# Patient Record
Sex: MALE | Race: WHITE | NOT HISPANIC OR LATINO | Employment: OTHER | URBAN - METROPOLITAN AREA
[De-identification: names, ages, dates, MRNs, and addresses within clinical notes are randomized per-mention and may not be internally consistent; named-entity substitution may affect disease eponyms.]

---

## 2017-03-08 ENCOUNTER — HOSPITAL ENCOUNTER (OUTPATIENT)
Facility: MEDICAL CENTER | Age: 57
End: 2017-03-08
Attending: EMERGENCY MEDICINE
Payer: COMMERCIAL

## 2017-03-08 ENCOUNTER — OFFICE VISIT (OUTPATIENT)
Dept: URGENT CARE | Facility: PHYSICIAN GROUP | Age: 57
End: 2017-03-08
Payer: COMMERCIAL

## 2017-03-08 VITALS
BODY MASS INDEX: 27.09 KG/M2 | SYSTOLIC BLOOD PRESSURE: 108 MMHG | OXYGEN SATURATION: 96 % | HEIGHT: 72 IN | TEMPERATURE: 98.6 F | RESPIRATION RATE: 16 BRPM | DIASTOLIC BLOOD PRESSURE: 78 MMHG | HEART RATE: 80 BPM | WEIGHT: 200 LBS

## 2017-03-08 DIAGNOSIS — R30.0 DYSURIA: ICD-10-CM

## 2017-03-08 LAB
APPEARANCE UR: CLEAR
BILIRUB UR STRIP-MCNC: NEGATIVE MG/DL
COLOR UR AUTO: NORMAL
GLUCOSE UR STRIP.AUTO-MCNC: NEGATIVE MG/DL
KETONES UR STRIP.AUTO-MCNC: NEGATIVE MG/DL
LEUKOCYTE ESTERASE UR QL STRIP.AUTO: NEGATIVE
NITRITE UR QL STRIP.AUTO: NEGATIVE
PH UR STRIP.AUTO: 6 [PH] (ref 5–8)
PROT UR QL STRIP: NEGATIVE MG/DL
RBC UR QL AUTO: NEGATIVE
SP GR UR STRIP.AUTO: 1.02
UROBILINOGEN UR STRIP-MCNC: NEGATIVE MG/DL

## 2017-03-08 PROCEDURE — 87591 N.GONORRHOEAE DNA AMP PROB: CPT

## 2017-03-08 PROCEDURE — 87491 CHLMYD TRACH DNA AMP PROBE: CPT

## 2017-03-08 PROCEDURE — 87086 URINE CULTURE/COLONY COUNT: CPT

## 2017-03-08 PROCEDURE — 99214 OFFICE O/P EST MOD 30 MIN: CPT | Performed by: EMERGENCY MEDICINE

## 2017-03-08 PROCEDURE — 81002 URINALYSIS NONAUTO W/O SCOPE: CPT | Performed by: EMERGENCY MEDICINE

## 2017-03-08 ASSESSMENT — ENCOUNTER SYMPTOMS
EYE DISCHARGE: 0
HEADACHES: 0
CHILLS: 0
TINGLING: 0
COUGH: 0
NERVOUS/ANXIOUS: 0
EYE REDNESS: 0
HEMOPTYSIS: 0
SWEATS: 0
VOMITING: 0

## 2017-03-08 NOTE — MR AVS SNAPSHOT
"Jeremy Berkowitz   3/8/2017 9:55 AM   Office Visit   MRN: [id number]    Department:  [facility]   Dept Phone:  [phone]    Description:  Male : 1960   Provider:  OBIE Mayes M.D.           Reason for Visit     Urinary Pain C/o slight pain/burning, retention, bad odor, red spots on glans penis (uses soap and water to clean tip) x6 months      Allergies as of 3/8/2017     No Known Allergies      You were diagnosed with     Dysuria   [788.1.ICD-9-CM]         Vital Signs     Blood Pressure Pulse Temperature Respirations Height Weight    108/78 mmHg 80 37 °C (98.6 °F) 16 1.829 m (6' 0.01\") 90.719 kg (200 lb)    Body Mass Index Oxygen Saturation Smoking Status             27.12 kg/m2 96% Never Smoker          Basic Information     Date Of Birth Sex Race Ethnicity Preferred Language    1960 Male White Non- English      Health Maintenance        Date Due Completion Dates    COLONOSCOPY 3/14/2010 ---    IMM INFLUENZA (1) 2016 ---    IMM DTaP/Tdap/Td Vaccine (2 - Td) 2024            Results     POCT Urinalysis      Component Value Standard Range & Units    POC Color Dark Yellow Negative    POC Appearance Clear Negative    POC Leukocyte Esterase Negative Negative    POC Nitrites Negative Negative    POC Urobiligen Negative Negative (0.2) mg/dL    POC Protein Negative Negative mg/dL    POC Urine PH 6.0 5.0 - 8.0    POC Blood Negative Negative    POC Specific Gravity 1.020 <1.005 - >1.030    POC Ketones Negative Negative mg/dL    POC Biliruben Negative Negative mg/dL    POC Glucose Negative Negative mg/dL                        Current Immunizations     Tdap Vaccine 2014  3:07 PM      Below and/or attached are the medications your provider expects you to take. Review all of your home medications and newly ordered medications with your provider and/or pharmacist. Follow medication instructions as directed by your provider and/or pharmacist. Please keep your " medication list with you and share with your provider. Update the information when medications are discontinued, doses are changed, or new medications (including over-the-counter products) are added; and carry medication information at all times in the event of emergency situations     Allergies:  No Known Allergies          Medications  Valid as of: March 08, 2017 - 11:42 AM    Generic Name Brand Name Tablet Size Instructions for use    Azithromycin (Tab) ZITHROMAX 250 MG Z-kristel, Use as directed.        .                 Medicines prescribed today were sent to:     [id number] - [place] - [address] [place] [address]    Phone: [phone] Fax: [phone]    Open 24 Hours?: No      Medication refill instructions:       If your prescription bottle indicates you have medication refills left, it is not necessary to call your provider’s office. Please contact your pharmacy and they will refill your medication.    If your prescription bottle indicates you do not have any refills left, you may request refills at any time through one of the following ways: The online [employer] system (except Urgent Care), by calling your provider’s office, or by asking your pharmacy to contact your provider’s office with a refill request. Medication refills are processed only during regular business hours and may not be available until the next business day. Your provider may request additional information or to have a follow-up visit with you prior to refilling your medication.   *Please Note: Medication refills are assigned a new Rx number when refilled electronically. Your pharmacy may indicate that no refills were authorized even though a new prescription for the same medication is available at the pharmacy. Please request the medicine by name with the pharmacy before contacting your provider for a refill.        Your To Do List     Future Labs/Procedures Complete By Expires    CHLAMYDIA/GC PCR URINE OR SWAB  As  directed 3/8/2018    URINE CULTURE(NEW)  As directed 3/8/2018      Instructions    We will call the patient with lab results.          [employer] Access Code: Activation code not generated  Current [employer] Status: Active

## 2017-03-08 NOTE — PROGRESS NOTES
Subjective:      Jeremy Berkowitz is a 56 y.o. male who presents with Urinary Pain            Dysuria   This is a new problem. The current episode started more than 1 month ago (6 months). The problem has been waxing and waning (patient states that he's had dysuria with penile tip lesions for the past 6 months associated with foul-smelling urine. His wife was recently treated for UTI he's going to check with her call me with the results of her infection. He also has porphyria.). The pain is mild. There has been no fever. He is sexually active. There is no history of pyelonephritis. Associated symptoms include a discharge (occassionally). Pertinent negatives include no chills, hematuria, sweats or vomiting. Associated symptoms comments: malodorous urine. Patient states he has to clean his penis for times a day or shower frequently more than 2 times a day in order to keep the red irritation from. Treatments tried: frequent showers. There is no history of catheterization, kidney stones, recurrent UTIs, a single kidney, urinary stasis or a urological procedure.     No Known Allergies    Social History     Social History   • Marital Status:      Spouse Name: N/A   • Number of Children: N/A   • Years of Education: N/A     Occupational History   • Not on file.     Social History Main Topics   • Smoking status: Never Smoker    • Smokeless tobacco: Not on file   • Alcohol Use: Yes      Comment: occ   • Drug Use: No   • Sexual Activity: Not on file     Other Topics Concern   • Not on file     Social History Narrative   History reviewed. No pertinent past medical history.   Current Outpatient Prescriptions on File Prior to Visit   Medication Sig Dispense Refill   • azithromycin (ZITHROMAX) 250 MG Tab Z-kristel, Use as directed. 6 Tab 0     No current facility-administered medications on file prior to visit.   History reviewed. No pertinent family history.   Review of Systems   Constitutional: Negative for chills.  "  Eyes: Negative for discharge and redness.   Respiratory: Negative for cough and hemoptysis.    Cardiovascular: Negative for chest pain.   Gastrointestinal: Negative for vomiting.   Genitourinary: Positive for dysuria. Negative for hematuria.   Skin: Positive for rash.        Intermittent rash on the tip of his penis. Resolved with frequent cleaning. No history of diabetes.   Neurological: Negative for tingling and headaches.   Psychiatric/Behavioral: The patient is not nervous/anxious.           Objective:     /78 mmHg  Pulse 80  Temp(Src) 37 °C (98.6 °F)  Resp 16  Ht 1.829 m (6' 0.01\")  Wt 90.719 kg (200 lb)  BMI 27.12 kg/m2  SpO2 96%     Physical Exam   Constitutional: He appears well-developed and well-nourished. No distress.   HENT:   Head: Normocephalic and atraumatic.   Right Ear: External ear normal.   Left Ear: External ear normal.   Eyes: Conjunctivae are normal. Right eye exhibits no discharge. Left eye exhibits no discharge.   Neck: Normal range of motion.   Genitourinary: Prostate normal and penis normal. Guaiac negative stool. No penile tenderness.   Patient is uncircumcised and has no penile lesions bilaterally descended. His prostate is nontender, guaiac negative stool.   Musculoskeletal: Normal range of motion.   Neurological: He is alert.   Skin: Skin is warm and dry. No rash noted. He is not diaphoretic. No erythema.   Psychiatric: He has a normal mood and affect.   Vitals reviewed.           POCT UA Neg     Urine culture pending    GC / Chalmydia pending  Assessment/Plan:     1. Dysuria    Patient will push fluids.  "

## 2017-03-09 LAB
C TRACH DNA GENITAL QL NAA+PROBE: NEGATIVE
N GONORRHOEA DNA GENITAL QL NAA+PROBE: NEGATIVE
SPECIMEN SOURCE: NORMAL

## 2017-03-10 LAB
BACTERIA UR CULT: NORMAL
SIGNIFICANT IND 70042: NORMAL
SITE SITE: NORMAL
SOURCE SOURCE: NORMAL

## 2017-03-11 ENCOUNTER — HOSPITAL ENCOUNTER (OUTPATIENT)
Dept: LAB | Facility: MEDICAL CENTER | Age: 57
End: 2017-03-11
Attending: PHYSICIAN ASSISTANT
Payer: COMMERCIAL

## 2017-03-11 ENCOUNTER — TELEPHONE (OUTPATIENT)
Dept: URGENT CARE | Facility: PHYSICIAN GROUP | Age: 57
End: 2017-03-11

## 2017-03-11 LAB
ALBUMIN SERPL BCP-MCNC: 4 G/DL (ref 3.2–4.9)
ALBUMIN/GLOB SERPL: 1.3 G/DL
ALP SERPL-CCNC: 61 U/L (ref 30–99)
ALT SERPL-CCNC: 18 U/L (ref 2–50)
ANION GAP SERPL CALC-SCNC: 11 MMOL/L (ref 0–11.9)
AST SERPL-CCNC: 23 U/L (ref 12–45)
BASOPHILS # BLD AUTO: 0.03 K/UL (ref 0–0.12)
BASOPHILS NFR BLD AUTO: 0.6 % (ref 0–1.8)
BILIRUB SERPL-MCNC: 0.7 MG/DL (ref 0.1–1.5)
BUN SERPL-MCNC: 19 MG/DL (ref 8–22)
CALCIUM SERPL-MCNC: 9.2 MG/DL (ref 8.5–10.5)
CHLORIDE SERPL-SCNC: 107 MMOL/L (ref 96–112)
CHOLEST SERPL-MCNC: 190 MG/DL (ref 100–199)
CO2 SERPL-SCNC: 20 MMOL/L (ref 20–33)
CREAT SERPL-MCNC: 1.03 MG/DL (ref 0.5–1.4)
EOSINOPHIL # BLD: 0.07 K/UL (ref 0–0.51)
EOSINOPHIL NFR BLD AUTO: 1.3 % (ref 0–6.9)
ERYTHROCYTE [DISTWIDTH] IN BLOOD BY AUTOMATED COUNT: 42 FL (ref 35.9–50)
FERRITIN SERPL-MCNC: 10.1 NG/ML (ref 22–322)
GLOBULIN SER CALC-MCNC: 3 G/DL (ref 1.9–3.5)
GLUCOSE SERPL-MCNC: 85 MG/DL (ref 65–99)
HCT VFR BLD AUTO: 42.3 % (ref 42–52)
HDLC SERPL-MCNC: 60 MG/DL
HGB BLD-MCNC: 13.3 G/DL (ref 14–18)
IMM GRANULOCYTES # BLD AUTO: 0.01 K/UL (ref 0–0.11)
IMM GRANULOCYTES NFR BLD AUTO: 0.2 % (ref 0–0.9)
LDLC SERPL CALC-MCNC: 120 MG/DL
LYMPHOCYTES # BLD: 2.05 K/UL (ref 1–4.8)
LYMPHOCYTES NFR BLD AUTO: 38 % (ref 22–41)
MCH RBC QN AUTO: 25.8 PG (ref 27–33)
MCHC RBC AUTO-ENTMCNC: 31.4 G/DL (ref 33.7–35.3)
MCV RBC AUTO: 82 FL (ref 81.4–97.8)
MONOCYTES # BLD: 0.44 K/UL (ref 0–0.85)
MONOCYTES NFR BLD AUTO: 8.1 % (ref 0–13.4)
NEUTROPHILS # BLD: 2.8 K/UL (ref 1.82–7.42)
NEUTROPHILS NFR BLD AUTO: 51.8 % (ref 44–72)
NRBC # BLD AUTO: 0 K/UL
NRBC BLD-RTO: 0 /100 WBC
PLATELET # BLD AUTO: 239 K/UL (ref 164–446)
PMV BLD AUTO: 10.7 FL (ref 9–12.9)
POTASSIUM SERPL-SCNC: 4.7 MMOL/L (ref 3.6–5.5)
PROT SERPL-MCNC: 7 G/DL (ref 6–8.2)
PSA SERPL DL<=0.01 NG/ML-MCNC: 0.24 NG/ML (ref 0–4)
RBC # BLD AUTO: 5.16 M/UL (ref 4.7–6.1)
SODIUM SERPL-SCNC: 138 MMOL/L (ref 135–145)
TRIGL SERPL-MCNC: 49 MG/DL (ref 0–149)
WBC # BLD AUTO: 5.4 K/UL (ref 4.8–10.8)

## 2017-03-11 PROCEDURE — 82728 ASSAY OF FERRITIN: CPT

## 2017-03-11 PROCEDURE — 84120 ASSAY OF URINE PORPHYRINS: CPT

## 2017-03-11 PROCEDURE — 85025 COMPLETE CBC W/AUTO DIFF WBC: CPT

## 2017-03-11 PROCEDURE — 36415 COLL VENOUS BLD VENIPUNCTURE: CPT

## 2017-03-11 PROCEDURE — 84153 ASSAY OF PSA TOTAL: CPT

## 2017-03-11 PROCEDURE — 80061 LIPID PANEL: CPT

## 2017-03-11 PROCEDURE — 84110 ASSAY OF PORPHOBILINOGEN: CPT

## 2017-03-11 PROCEDURE — 80053 COMPREHEN METABOLIC PANEL: CPT

## 2017-03-15 LAB
COLLECTION LENGTH 278029: ABNORMAL
COPRO1/CREAT UR-SRTO: 1 UMOL/MOL CRT (ref 0–6)
COPRO3/CREAT UR-SRTO: 3 UMOL/MOL CRT (ref 0–14)
CREAT 24H UR-MCNC: 134 MG/DL
CREAT 24H UR-MRATE: ABNORMAL MG/D (ref 800–2100)
HEPTACARBOXYLATE/CREAT UR-SRTO: 2 UMOL/MOL CRT (ref 0–2)
PBG 24H UR-SRATE: ABNORMAL UMOL/D (ref 0–11)
PBG UR-SCNC: 3.4 UMOL/L (ref 0–8.8)
PORPHYRIN FRACT 24H UR-IMP: ABNORMAL
TOTAL VOLUME 1105: 1775 ML
UROPOR/CREAT 24H UR-SRTO: 10 UMOL/MOL CRT (ref 0–4)

## 2018-03-31 ENCOUNTER — HOSPITAL ENCOUNTER (OUTPATIENT)
Dept: LAB | Facility: MEDICAL CENTER | Age: 58
End: 2018-03-31
Attending: FAMILY MEDICINE
Payer: COMMERCIAL

## 2018-03-31 LAB
FERRITIN SERPL-MCNC: 50.5 NG/ML (ref 22–322)
IRON SATN MFR SERPL: 28 % (ref 15–55)
IRON SERPL-MCNC: 119 UG/DL (ref 50–180)
TIBC SERPL-MCNC: 427 UG/DL (ref 250–450)

## 2018-03-31 PROCEDURE — 83540 ASSAY OF IRON: CPT

## 2018-03-31 PROCEDURE — 84110 ASSAY OF PORPHOBILINOGEN: CPT

## 2018-03-31 PROCEDURE — 82728 ASSAY OF FERRITIN: CPT

## 2018-03-31 PROCEDURE — 83550 IRON BINDING TEST: CPT

## 2018-03-31 PROCEDURE — 36415 COLL VENOUS BLD VENIPUNCTURE: CPT

## 2018-04-03 LAB
COLLECT DURATION TIME SPEC: 24 HRS
CREAT 24H UR-MCNC: 171 MG/DL
CREAT 24H UR-MRATE: 2266 MG/D (ref 800–2100)
PBG 24H UR-SRATE: 6.2 UMOL/D (ref 0–11)
PBG UR-SCNC: 4.7 UMOL/L (ref 0–8.8)
TOTAL VOLUME 1105: 1325 ML

## 2019-02-23 ENCOUNTER — HOSPITAL ENCOUNTER (OUTPATIENT)
Facility: MEDICAL CENTER | Age: 59
End: 2019-02-23
Attending: FAMILY MEDICINE
Payer: COMMERCIAL

## 2019-02-23 PROCEDURE — 82570 ASSAY OF URINE CREATININE: CPT

## 2019-02-23 PROCEDURE — 84120 ASSAY OF URINE PORPHYRINS: CPT

## 2019-02-23 PROCEDURE — 84110 ASSAY OF PORPHOBILINOGEN: CPT

## 2019-02-27 LAB
COPRO1/CREAT UR-SRTO: 1 UMOL/MOL CRT (ref 0–6)
COPRO3/CREAT UR-SRTO: 2 UMOL/MOL CRT (ref 0–14)
CREAT 24H UR-MCNC: 107 MG/DL
CREAT 24H UR-MRATE: 2033 MG/D (ref 800–2100)
HEPTACARBOXYLATE/CREAT UR-SRTO: 1 UMOL/MOL CRT (ref 0–2)
PBG 24H UR-SRATE: 7.4 UMOL/D (ref 0–11)
PBG UR-SCNC: 3.9 UMOL/L (ref 0–8.8)
PORPHYRIN FRACT 24H UR-IMP: NEGATIVE
TOTAL VOLUME 1105: 1900 ML
UROPOR/CREAT 24H UR-SRTO: 3 UMOL/MOL CRT (ref 0–4)

## 2020-07-14 ENCOUNTER — HOSPITAL ENCOUNTER (OUTPATIENT)
Dept: LAB | Facility: MEDICAL CENTER | Age: 60
End: 2020-07-14
Attending: FAMILY MEDICINE
Payer: COMMERCIAL

## 2020-07-14 LAB
ALBUMIN SERPL BCP-MCNC: 4.3 G/DL (ref 3.2–4.9)
ALBUMIN/GLOB SERPL: 1.7 G/DL
ALP SERPL-CCNC: 67 U/L (ref 30–99)
ALT SERPL-CCNC: 18 U/L (ref 2–50)
ANION GAP SERPL CALC-SCNC: 12 MMOL/L (ref 7–16)
AST SERPL-CCNC: 19 U/L (ref 12–45)
BASOPHILS # BLD AUTO: 0.6 % (ref 0–1.8)
BASOPHILS # BLD: 0.03 K/UL (ref 0–0.12)
BILIRUB SERPL-MCNC: 0.6 MG/DL (ref 0.1–1.5)
BUN SERPL-MCNC: 18 MG/DL (ref 8–22)
CALCIUM SERPL-MCNC: 9.5 MG/DL (ref 8.5–10.5)
CHLORIDE SERPL-SCNC: 102 MMOL/L (ref 96–112)
CHOLEST SERPL-MCNC: 207 MG/DL (ref 100–199)
CO2 SERPL-SCNC: 25 MMOL/L (ref 20–33)
CREAT SERPL-MCNC: 0.84 MG/DL (ref 0.5–1.4)
EOSINOPHIL # BLD AUTO: 0.12 K/UL (ref 0–0.51)
EOSINOPHIL NFR BLD: 2.4 % (ref 0–6.9)
ERYTHROCYTE [DISTWIDTH] IN BLOOD BY AUTOMATED COUNT: 43 FL (ref 35.9–50)
FASTING STATUS PATIENT QL REPORTED: NORMAL
GLOBULIN SER CALC-MCNC: 2.5 G/DL (ref 1.9–3.5)
GLUCOSE SERPL-MCNC: 99 MG/DL (ref 65–99)
HCT VFR BLD AUTO: 47.5 % (ref 42–52)
HDLC SERPL-MCNC: 74 MG/DL
HGB BLD-MCNC: 15.4 G/DL (ref 14–18)
IMM GRANULOCYTES # BLD AUTO: 0.01 K/UL (ref 0–0.11)
IMM GRANULOCYTES NFR BLD AUTO: 0.2 % (ref 0–0.9)
LDLC SERPL CALC-MCNC: 115 MG/DL
LYMPHOCYTES # BLD AUTO: 1.9 K/UL (ref 1–4.8)
LYMPHOCYTES NFR BLD: 38.2 % (ref 22–41)
MCH RBC QN AUTO: 29.8 PG (ref 27–33)
MCHC RBC AUTO-ENTMCNC: 32.4 G/DL (ref 33.7–35.3)
MCV RBC AUTO: 91.9 FL (ref 81.4–97.8)
MONOCYTES # BLD AUTO: 0.32 K/UL (ref 0–0.85)
MONOCYTES NFR BLD AUTO: 6.4 % (ref 0–13.4)
NEUTROPHILS # BLD AUTO: 2.59 K/UL (ref 1.82–7.42)
NEUTROPHILS NFR BLD: 52.2 % (ref 44–72)
NRBC # BLD AUTO: 0 K/UL
NRBC BLD-RTO: 0 /100 WBC
PLATELET # BLD AUTO: 207 K/UL (ref 164–446)
PMV BLD AUTO: 9.7 FL (ref 9–12.9)
POTASSIUM SERPL-SCNC: 4.9 MMOL/L (ref 3.6–5.5)
PROT SERPL-MCNC: 6.8 G/DL (ref 6–8.2)
PSA SERPL-MCNC: 0.25 NG/ML (ref 0–4)
RBC # BLD AUTO: 5.17 M/UL (ref 4.7–6.1)
SODIUM SERPL-SCNC: 139 MMOL/L (ref 135–145)
TRIGL SERPL-MCNC: 91 MG/DL (ref 0–149)
WBC # BLD AUTO: 5 K/UL (ref 4.8–10.8)

## 2020-07-14 PROCEDURE — 85025 COMPLETE CBC W/AUTO DIFF WBC: CPT

## 2020-07-14 PROCEDURE — 84153 ASSAY OF PSA TOTAL: CPT

## 2020-07-14 PROCEDURE — 36415 COLL VENOUS BLD VENIPUNCTURE: CPT

## 2020-07-14 PROCEDURE — 80053 COMPREHEN METABOLIC PANEL: CPT

## 2020-07-14 PROCEDURE — 80061 LIPID PANEL: CPT

## 2020-08-18 ENCOUNTER — HOSPITAL ENCOUNTER (OUTPATIENT)
Dept: RADIOLOGY | Facility: MEDICAL CENTER | Age: 60
End: 2020-08-18
Attending: FAMILY MEDICINE
Payer: COMMERCIAL

## 2020-08-18 DIAGNOSIS — R07.89 CHEST DISCOMFORT: ICD-10-CM

## 2020-08-18 DIAGNOSIS — I70.90 ARTERIOSCLEROSIS: ICD-10-CM

## 2020-08-18 PROCEDURE — 4410556 CT-CARDIAC SCORING

## 2020-08-25 ENCOUNTER — HOSPITAL ENCOUNTER (OUTPATIENT)
Dept: CARDIOLOGY | Facility: MEDICAL CENTER | Age: 60
End: 2020-08-25
Attending: FAMILY MEDICINE
Payer: COMMERCIAL

## 2020-08-25 DIAGNOSIS — R07.89 CHEST DISCOMFORT: ICD-10-CM

## 2020-08-25 DIAGNOSIS — I70.90 ARTERIOSCLEROSIS: ICD-10-CM

## 2020-08-25 LAB — LV EJECT FRACT  99904: 65

## 2020-08-25 PROCEDURE — 93017 CV STRESS TEST TRACING ONLY: CPT

## 2020-08-25 PROCEDURE — 93350 STRESS TTE ONLY: CPT | Mod: 26 | Performed by: INTERNAL MEDICINE

## 2020-08-25 PROCEDURE — 93018 CV STRESS TEST I&R ONLY: CPT | Performed by: INTERNAL MEDICINE

## 2020-09-24 ENCOUNTER — OFFICE VISIT (OUTPATIENT)
Dept: CARDIOLOGY | Facility: MEDICAL CENTER | Age: 60
End: 2020-09-24
Payer: COMMERCIAL

## 2020-09-24 VITALS
OXYGEN SATURATION: 97 % | WEIGHT: 189 LBS | DIASTOLIC BLOOD PRESSURE: 82 MMHG | SYSTOLIC BLOOD PRESSURE: 120 MMHG | HEIGHT: 69 IN | BODY MASS INDEX: 27.99 KG/M2 | HEART RATE: 58 BPM | RESPIRATION RATE: 12 BRPM

## 2020-09-24 DIAGNOSIS — R07.89 CHEST DISCOMFORT: ICD-10-CM

## 2020-09-24 DIAGNOSIS — I44.7 LBBB (LEFT BUNDLE BRANCH BLOCK): ICD-10-CM

## 2020-09-24 DIAGNOSIS — E78.2 MIXED HYPERLIPIDEMIA: ICD-10-CM

## 2020-09-24 PROCEDURE — 99204 OFFICE O/P NEW MOD 45 MIN: CPT | Performed by: INTERNAL MEDICINE

## 2020-09-24 ASSESSMENT — ENCOUNTER SYMPTOMS
PARESTHESIAS: 0
DIZZINESS: 0
FEVER: 0
LOSS OF BALANCE: 0
SHORTNESS OF BREATH: 0
LIGHT-HEADEDNESS: 0
CONSTIPATION: 0
VOMITING: 0
FALLS: 0
DOUBLE VISION: 0
SORE THROAT: 0
BLURRED VISION: 0
NEAR-SYNCOPE: 0
NIGHT SWEATS: 0
COUGH: 0
WHEEZING: 0
NAUSEA: 0
SYNCOPE: 0
DIAPHORESIS: 0
PND: 0
PALPITATIONS: 0
DECREASED APPETITE: 0
WEAKNESS: 0
ORTHOPNEA: 0
BACK PAIN: 0
BLOATING: 0
NUMBNESS: 0
MYALGIAS: 0
EXCESSIVE DAYTIME SLEEPINESS: 0
DYSPNEA ON EXERTION: 0
DIARRHEA: 0
IRREGULAR HEARTBEAT: 0
SLEEP DISTURBANCES DUE TO BREATHING: 0
HEADACHES: 0

## 2020-09-24 ASSESSMENT — FIBROSIS 4 INDEX: FIB4 SCORE: 1.3

## 2020-09-24 NOTE — PROGRESS NOTES
Cardiology Initial Consultation Note    Date of note:    9/24/2020    Primary Care Provider: Hi Rader M.D.  Referring Provider: Hi Rader M.D.     Patient Name: Jeremy Berkowitz   YOB: 1960  MRN:              [id number]    Chief Complaint   Patient presents with   • Hyperlipidemia     NP       History of Present Illness: Mr. Jeremy Berkowitz is a 60 y.o. male whose current medical problems include hyperlipidemia who is here for cardiac consultation for left bundle branch block.    Patient states that he had back x-ray done and was noted to have atherosclerotic plaque in the abdominal aorta.  Based on that his primary care physician, Dr. Rader, ordered ECG, CT coronary calcium and stress echocardiogram.  He was noted to have left bundle branch block on the ECG, CT coronary calcium score was 0 and during the treadmill stress echocardiogram he achieved 9.8 METS with nondiagnostic ECG and abnormal dyskinetic septal wall motion abnormality due to left bundle branch block with no visible inducible ischemia and decrease in left ventricular size with stress.    Patient currently denies any concerning symptoms.  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  The discomfort usually only lasts a few seconds.  Denies any previous history of heart attack.  Denies any current symptoms of substernal chest pressure with activity or rest, shortness of breath or dyspnea exertion or palpitations.  Takes no medications.    Cardiovascular Risk Factors:  1. Smoking status: Former smoker; quit longer than 10 years ago  2. Type II Diabetes Mellitus: No  3. Hypertension: No  4. Dyslipidemia:    Cholesterol,Tot   Date Value Ref Range Status   07/14/2020 207 (H) 100 - 199 mg/dL Final     LDL   Date Value Ref Range Status   07/14/2020 115 (H) <100 mg/dL Final     HDL   Date  Value Ref Range Status   07/14/2020 74 >=40 mg/dL Final     Triglycerides   Date Value Ref Range Status   07/14/2020 91 0 - 149 mg/dL Final     5. Family history of early Coronary Artery Disease in a first degree relative (Male less than 55 years of age; Female less than 65 years of age): Denies  6.  Obesity and/or Metabolic Syndrome: No  7. Sedentary lifestyle: No    Review of Systems   Constitution: Negative for decreased appetite, diaphoresis, fever, malaise/fatigue and night sweats.   HENT: Negative for congestion and sore throat.    Eyes: Negative for blurred vision and double vision.   Cardiovascular: Negative for chest pain, cyanosis, dyspnea on exertion, irregular heartbeat, leg swelling, near-syncope, orthopnea, palpitations, paroxysmal nocturnal dyspnea and syncope.   Respiratory: Negative for cough, shortness of breath, sleep disturbances due to breathing and wheezing.    Endocrine: Negative for cold intolerance and heat intolerance.   Musculoskeletal: Negative for back pain, falls and myalgias.   Gastrointestinal: Negative for bloating, constipation, diarrhea, nausea and vomiting.   Neurological: Negative for excessive daytime sleepiness, dizziness, headaches, light-headedness, loss of balance, numbness, paresthesias and weakness.       No pertinent past medical or surgical history    Current Outpatient Medications   Medication Sig Dispense Refill   • econazole nitrate 1 % cream USE 1 APPLICATION TOPICALLY TWICE DAILY FOR 14 DAYS     • azithromycin (ZITHROMAX) 250 MG Tab Z-kristel, Use as directed. (Patient not taking: Reported on 9/24/2020) 6 Tab 0     No current facility-administered medications for this visit.          No Known Allergies      No family history on file.      Social History     Socioeconomic History   • Marital status:      Spouse name: Not on file   • Number of children: Not on file   • Years of education: Not on file   • Highest education level: Not on file   Occupational History    "  • Not on file   Social Needs   • Financial resource strain: Not on file   • Food insecurity     Worry: Not on file     Inability: Not on file   • Transportation needs     Medical: Not on file     Non-medical: Not on file   Tobacco Use   • Smoking status: Never Smoker   • Smokeless tobacco: Never Used   Substance and Sexual Activity   • Alcohol use: Yes     Comment: occ   • Drug use: No   • Sexual activity: Not on file   Lifestyle   • Physical activity     Days per week: Not on file     Minutes per session: Not on file   • Stress: Not on file   Relationships   • Social connections     Talks on phone: Not on file     Gets together: Not on file     Attends Jewish service: Not on file     Active member of club or organization: Not on file     Attends meetings of clubs or organizations: Not on file     Relationship status: Not on file   • Intimate partner violence     Fear of current or ex partner: Not on file     Emotionally abused: Not on file     Physically abused: Not on file     Forced sexual activity: Not on file   Other Topics Concern   • Not on file   Social History Narrative   • Not on file         Physical Exam:  Ambulatory Vitals  /82 (BP Location: Left arm, Patient Position: Sitting, BP Cuff Size: Adult)   Pulse (!) 58   Resp 12   Ht 1.753 m (5' 9\")   Wt 85.7 kg (189 lb)   SpO2 97%    Oxygen Therapy:  Pulse Oximetry: 97 %  BP Readings from Last 4 Encounters:   09/24/20 120/82   03/08/17 108/78   04/01/16 126/82   08/08/14 136/92       Weight/BMI: Body mass index is 27.91 kg/m².  Wt Readings from Last 4 Encounters:   09/24/20 85.7 kg (189 lb)   03/08/17 90.7 kg (200 lb)   04/01/16 86.2 kg (190 lb)   08/08/14 86.2 kg (190 lb)         General: Well appearing and in no apparent distress  Eyes: nl conjunctiva, no icteric sclera  ENT: wearing a mask, normal external appearance of ears  Neck: no visible JVP,  no carotid bruits  Lungs: normal respiratory effort, CTAB  Heart: RRR, no murmurs, no rubs " or gallops,  no edema bilateral lower extremities. No LV/RV heave on cardiac palpatation. + bilateral radial pulses.  + bilateral dp pulses.   Abdomen: soft, non tender, non distended, no masses, normal bowel sounds.  No HSM.  Extremities/MSK: no clubbing, no cyanosis  Neurological: No focal sensory deficits  Psychiatric: Appropriate affect, A/O x 3, intact judgement and insight  Skin: Warm extremities      Lab Data Review:  Lab Results   Component Value Date/Time    CHOLSTRLTOT 207 (H) 07/14/2020 10:29 AM     (H) 07/14/2020 10:29 AM    HDL 74 07/14/2020 10:29 AM    TRIGLYCERIDE 91 07/14/2020 10:29 AM       Lab Results   Component Value Date/Time    SODIUM 139 07/14/2020 10:29 AM    POTASSIUM 4.9 07/14/2020 10:29 AM    CHLORIDE 102 07/14/2020 10:29 AM    CO2 25 07/14/2020 10:29 AM    GLUCOSE 99 07/14/2020 10:29 AM    BUN 18 07/14/2020 10:29 AM    CREATININE 0.84 07/14/2020 10:29 AM     Lab Results   Component Value Date/Time    ALKPHOSPHAT 67 07/14/2020 10:29 AM    ASTSGOT 19 07/14/2020 10:29 AM    ALTSGPT 18 07/14/2020 10:29 AM    TBILIRUBIN 0.6 07/14/2020 10:29 AM      Lab Results   Component Value Date/Time    WBC 5.0 07/14/2020 10:29 AM     No results found for: HBA1C      Cardiac Imaging and Procedures Review:    EKG dated 8/25/2020: Sinus rhythm with left bundle branch block      Exercise stress testing (8/25/2020):   CONCLUSIONS   Normal decreased in left ventricular size with stress.   Abnormal, dyskinetic septal wall motion abnormality more pronounced with   stress. Normal anterior wall   augmentation with stress. This is likely related to left bundle branch block.   No ischemic changes compared to baseline ECG, although, this is not sensitive   setting LBBB.   Hypertensive blood pressure response to exercise.   [facility] treadmill score +7.5; low risk.       Radiology test Review:  CT cardiac calcium score (8/18/2020):  Coronary calcium score was 0 with no plaque seen.      Assessment & Plan     1. Chest  discomfort  EC-ECHOCARDIOGRAM COMPLETE W/ CONT    CANCELED: EKG   2. LBBB (left bundle branch block)  EC-ECHOCARDIOGRAM COMPLETE W/ CONT    CANCELED: EKG   3. Mixed hyperlipidemia  EC-ECHOCARDIOGRAM COMPLETE W/ CONT    CANCELED: EKG         Shared Medical Decision Making:  Based on the ancillary data and testing, recommend aggressive lipid lowering strategy.  His coronary calcium score is 0, however, he reports visible atherosclerotic plaque on an x-ray.  Discussed option of either initiation of statin medication or aggressive dietary and lifestyle changes to lower LDL with goal less than 70.  Based on shared decision making, patient would like to initiate lifestyle changes first which I think is reasonable.  Repeat lipid panel in 3 to 6 months.  Discussed intake of low to non fat and cholesterol diet with plenty of fruits and vegetables.  Avoid precooked meals, decrease intake of dairy products and monitor sodium intake as well.    In regards to left bundle branch block, will repeat transthoracic echocardiogram in 1 year to monitor left ventricular size and function.  Discussed potential decline in LVEF due to chronic left bundle branch block which warrants monitoring on a yearly basis.  Patient voices understanding and is in agreement with the plan.    All of patient's excellent questions were answered to the best of my knowledge and to his satisfaction.  It was a pleasure seeing Mr. Jeremy Berkowitz in my clinic today. Return in about 1 year (around 9/24/2021).  with echocardiogram prior to the appointment.  Patient is aware to call the cardiology clinic with any questions or concerns.      Be Monzon MD  [facility]  [facility], [address]  [address] [address]  Phone: [phone]  Fax: [phone]

## 2021-03-15 DIAGNOSIS — Z23 NEED FOR VACCINATION: ICD-10-CM

## 2021-03-19 ENCOUNTER — IMMUNIZATION (OUTPATIENT)
Dept: FAMILY PLANNING/WOMEN'S HEALTH CLINIC | Facility: IMMUNIZATION CENTER | Age: 61
End: 2021-03-19
Attending: INTERNAL MEDICINE
Payer: OTHER GOVERNMENT

## 2021-03-19 DIAGNOSIS — Z23 NEED FOR VACCINATION: ICD-10-CM

## 2021-03-19 DIAGNOSIS — Z23 ENCOUNTER FOR VACCINATION: Primary | ICD-10-CM

## 2021-03-19 PROCEDURE — 0011A MODERNA SARS-COV-2 VACCINE: CPT

## 2021-03-19 PROCEDURE — 91301 MODERNA SARS-COV-2 VACCINE: CPT

## 2021-04-17 ENCOUNTER — IMMUNIZATION (OUTPATIENT)
Dept: FAMILY PLANNING/WOMEN'S HEALTH CLINIC | Facility: IMMUNIZATION CENTER | Age: 61
End: 2021-04-17
Attending: INTERNAL MEDICINE
Payer: OTHER GOVERNMENT

## 2021-04-17 DIAGNOSIS — Z23 ENCOUNTER FOR VACCINATION: Primary | ICD-10-CM

## 2021-04-17 PROCEDURE — 0012A MODERNA SARS-COV-2 VACCINE: CPT | Performed by: INTERNAL MEDICINE

## 2021-04-17 PROCEDURE — 91301 MODERNA SARS-COV-2 VACCINE: CPT | Performed by: INTERNAL MEDICINE
